# Patient Record
Sex: MALE | Race: AMERICAN INDIAN OR ALASKA NATIVE
[De-identification: names, ages, dates, MRNs, and addresses within clinical notes are randomized per-mention and may not be internally consistent; named-entity substitution may affect disease eponyms.]

---

## 2022-01-30 ENCOUNTER — HOSPITAL ENCOUNTER (EMERGENCY)
Dept: HOSPITAL 5 - ED | Age: 37
Discharge: HOME | End: 2022-01-30
Payer: COMMERCIAL

## 2022-01-30 VITALS — DIASTOLIC BLOOD PRESSURE: 119 MMHG | SYSTOLIC BLOOD PRESSURE: 157 MMHG

## 2022-01-30 DIAGNOSIS — I10: ICD-10-CM
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
## 2022-01-30 DIAGNOSIS — S93.401A: Primary | ICD-10-CM
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
## 2022-01-30 DIAGNOSIS — Z98.890: ICD-10-CM
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
## 2022-01-30 DIAGNOSIS — J45.909: ICD-10-CM

## 2022-01-30 DIAGNOSIS — F17.200: ICD-10-CM
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
## 2022-01-30 DIAGNOSIS — Z79.899: ICD-10-CM

## 2022-01-30 DIAGNOSIS — Z88.0: ICD-10-CM

## 2022-01-30 PROCEDURE — 99283 EMERGENCY DEPT VISIT LOW MDM: CPT

## 2022-01-30 NOTE — XRAY REPORT
RIGHT ANKLE 3 VIEWS

RIGHT FOOT 3 VIEWS



INDICATION:

right medial foot and ankle pain.



COMPARISON:

No relevant prior imaging study available.



FINDINGS:

Right ankle: No fracture or dislocation is seen. No significant degenerative changes. No significant 
soft tissue swelling.



Right foot: No fracture or dislocation. No significant soft tissue swelling.



IMPRESSION:

1. No acute findings.







Signer Name: Gigi Rodrigues MD 

Signed: 1/30/2022 11:52 AM

Workstation Name: [id number]

## 2022-01-30 NOTE — EMERGENCY DEPARTMENT REPORT
ED Lower Extremity HPI





- General


Chief Complaint: Extremity Injury, Lower


Stated Complaint: RGHT ANKLE INJURY


Time Seen by Provider: 01/30/22 11:18


Source: patient


Mode of arrival: Ambulatory


Limitations: No Limitations





- History of Present Illness


Initial Comments: 





Patient is a 36-year-old male presents emergency room complaints of a right 

ankle injury that occurred last night. Patient states that he was getting off 

his stage and did not realize how far down it was and when he stepped down he 

had an inversion injury of his ankle. He states he did not fall completely to 

the ground. He states since then he has been experiencing right ankle pain and 

swelling. He states when he was a kid he did have a fracture of this ankle. He 

denies any numbness or weakness. He is ambulatory but does have discomfort with 

ambulation. Allergy to penicillin. PMHx HTN and asthma





- Related Data


                                  Previous Rx's











 Medication  Instructions  Recorded  Last Taken  Type


 


Benzonatate [Tessalon Perle] 100 mg PO TID PRN #30 capsule 01/01/19 Unknown Rx


 


Naproxen 500 mg PO BID PRN #14 tab 01/30/22 Unknown Rx


 


amLODIPine 5 mg PO DAILY #30 tab 01/30/22 Unknown Rx











                                    Allergies











Allergy/AdvReac Type Severity Reaction Status Date / Time


 


Penicillins Allergy  Rash Verified 01/01/19 13:54














ED Review of Systems


ROS: 


Stated complaint: RGHT ANKLE INJURY


Other details as noted in HPI





Comment: All other systems reviewed and negative





ED Past Medical Hx





- Past Medical History


Previous Medical History?: Yes


Hx Hypertension: Yes (no meds, noncompliance)


Hx Asthma: Yes





- Surgical History


Past Surgical History?: Yes


Additional Surgical History: endocardititis





- Social History


Smoking Status: Current Every Day Smoker


Substance Use Type: Alcohol





- Medications


Home Medications: 


                                Home Medications











 Medication  Instructions  Recorded  Confirmed  Last Taken  Type


 


Benzonatate [Tessalon Perle] 100 mg PO TID PRN #30 capsule 01/01/19  Unknown Rx


 


Naproxen 500 mg PO BID PRN #14 tab 01/30/22  Unknown Rx


 


amLODIPine 5 mg PO DAILY #30 tab 01/30/22  Unknown Rx














ED Physical Exam





- General


Limitations: No Limitations


General appearance: alert, in no apparent distress





- Head


Head exam: Present: atraumatic, normocephalic





- Eye


Eye exam: Present: normal appearance





- ENT


ENT exam: Present: mucous membranes moist





- Extremities Exam


Extremities exam: Present: other (right medial ankle ttp and mild edema, FROM of

the RLE, no obvious deformity, neurovascularly intact)





- Neurological Exam


Neurological exam: Present: alert, oriented X3





- Psychiatric


Psychiatric exam: Present: normal affect, normal mood





- Skin


Skin exam: Present: warm, dry, intact





ED Course


                                   Vital Signs











  01/30/22 01/30/22





  11:16 12:14


 


Temperature 98.6 F 98.4 F


 


Pulse Rate 93 H 88


 


Respiratory 20 14





Rate  


 


Blood Pressure  157/119


 


Blood Pressure 165/106 





[Right]  


 


O2 Sat by Pulse 100 99





Oximetry  














ED Lower Extremity MDM





- Lab Data








                                   Vital Signs











  01/30/22 01/30/22





  11:16 12:14


 


Temperature 98.6 F 98.4 F


 


Pulse Rate 93 H 88


 


Respiratory 20 14





Rate  


 


Blood Pressure  157/119


 


Blood Pressure 165/106 





[Right]  


 


O2 Sat by Pulse 100 99





Oximetry  














- Radiology Data


Radiology results: report reviewed





Ordering Physician: KOMAL CORDOVA  


Date of Service: 01/30/22  


Procedure(s): XR ankle 3+V RT  


Accession Number(s): [id number]  


 


cc: KOMAL CORDOVA   


 


Fluoro Time In Minutes:   


 


RIGHT ANKLE 3 VIEWS  


 RIGHT FOOT 3 VIEWS  


 


 INDICATION:  


 right medial foot and ankle pain.  


 


 COMPARISON:  


 No relevant prior imaging study available.  


 


 FINDINGS:  


 Right ankle: No fracture or dislocation is seen. No significant degenerative 

changes. No 


significant soft tissue swelling.  


 


 Right foot: No fracture or dislocation. No significant soft tissue swelling.  


 


 IMPRESSION:  


 1. No acute findings.  


 


 


 


 Signer Name: Gigi Rodrigues MD   


 Signed: 1/30/2022 11:52 AM  


 Workstation Name: [id number]   


 


 


Transcribed By:   


Dictated By: Gigi Rodrigues MD  


Electronically Authenticated By: Gigi Rodrigues MD    


Signed Date/Time: 01/30/22 1152                                


 


 


 


DD/DT: 01/30/22 1151                                                            

  


TD/TT:





























- Medical Decision Making





Patient is a 36-year-old male presents emergency room complaints of a right 

ankle injury that occurred last night. Patient states that he was getting off 

his stage and did not realize how far down it was and when he stepped down he 

had an inversion injury of his ankle. He states he did not fall completely to 

the ground. He states since then he has been experiencing right ankle pain and 

swelling. He states when he was a kid he did have a fracture of this ankle. He 

denies any numbness or weakness. He is ambulatory but does have discomfort with 

ambulation. Allergy to penicillin. PMHx HTN and asthma. vitals with elevated 

blood pressure, otherwise stable.  Patient states that he has chronic 

hypertension, he is not on medication management, will start patient on low-dose

 amlodipine, advised low-sodium diet, incorporating daily exercise, and keeping 

a blood pressure log and taking this at the primary care physician.  On exam 

right medial ankle ttp and mild edema, FROM of the RLE, no obvious deformity, 

neurovascularly intact.  X-ray right ankle and foot  1. No acute findings.  

Symptoms and examination appear likely consistent with ankle sprain.  Patient 

placed in ankle stirrup splint and given crutches by tech and remained 

neurovascularly intact.  Discussed all findings with patient.  Advised patient 

Please take medication as prescribed.  May use ice 15 minutes at a time, rest, 

elevation of the leg.  Follow-up with a orthopedic doctor.  Return to emergency 

room for any new or worsening symptoms.


Critical care attestation.: 


If time is entered above; I have spent that time in minutes in the direct care 

of this critically ill patient, excluding procedure time.








ED Disposition


Clinical Impression: 


 Uncontrolled hypertension





Right ankle sprain


Qualifiers:


 Encounter type: initial encounter Involved ligament of ankle: unspecified 

ligament Qualified Code(s): S93.401A - Sprain of unspecified ligament of right 

ankle, initial encounter





Disposition: 01 HOME / SELF CARE / HOMELESS


Is pt being admited?: No


Does the pt Need Aspirin: No


Condition: Stable


Instructions:  Ankle Sprain, Elastic Bandage and RICE Therapy, Hypertension (ED)


Additional Instructions: 


Please take medication as prescribed.  May use ice 15 minutes at a time, rest, 

elevation of the leg.  Follow-up with a orthopedic doctor.  Return to emergency 

room for any new or worsening symptoms.


Prescriptions: 


amLODIPine 5 mg PO DAILY #30 tab


Naproxen 500 mg PO BID PRN #14 tab


 PRN Reason: pain


Referrals: 


PRIMARY CARE,MD [Primary Care Provider] - 3-5 Days


DOUG BIGGS MD [Staff Physician] - 3-5 Days


Forms:  Work/School Release Form(ED)


Time of Disposition: 11:57


Print Language: ENGLISH

## 2022-01-30 NOTE — XRAY REPORT
RIGHT ANKLE 3 VIEWS

RIGHT FOOT 3 VIEWS



INDICATION:

right medial foot and ankle pain.



COMPARISON:

No relevant prior imaging study available.



FINDINGS:

Right ankle: No fracture or dislocation is seen. No significant degenerative changes. No significant 
soft tissue swelling.



Right foot: No fracture or dislocation. No significant soft tissue swelling.



IMPRESSION:

1. No acute findings.







Signer Name: Gigi Rodrigues MD 

Signed: 1/30/2022 11:52 AM

Workstation Name: [id number]

## 2022-06-18 ENCOUNTER — HOSPITAL ENCOUNTER (EMERGENCY)
Dept: HOSPITAL 5 - ED | Age: 37
LOS: 2 days | Discharge: LEFT BEFORE DISCHARGE | End: 2022-06-20
Payer: COMMERCIAL

## 2022-06-18 VITALS — SYSTOLIC BLOOD PRESSURE: 147 MMHG | DIASTOLIC BLOOD PRESSURE: 97 MMHG

## 2022-06-18 DIAGNOSIS — R31.9: Primary | ICD-10-CM

## 2022-06-18 DIAGNOSIS — Z53.21: ICD-10-CM

## 2022-06-18 LAB
BILIRUB UR QL STRIP: (no result)
BLOOD UR QL VISUAL: (no result)
HGB S BLD QL SOLY: (no result)
MUCOUS THREADS #/AREA URNS HPF: (no result) /HPF
PH UR STRIP: 5 [PH] (ref 5–7)
RBC #/AREA URNS HPF: 1 /HPF (ref 0–6)
UROBILINOGEN UR-MCNC: 2 MG/DL (ref ?–2)
WBC #/AREA URNS HPF: 2 /HPF (ref 0–6)

## 2022-06-18 PROCEDURE — 81001 URINALYSIS AUTO W/SCOPE: CPT

## 2022-06-21 ENCOUNTER — HOSPITAL ENCOUNTER (EMERGENCY)
Dept: HOSPITAL 5 - ED | Age: 37
Discharge: HOME | End: 2022-06-21
Payer: COMMERCIAL

## 2022-06-21 VITALS — DIASTOLIC BLOOD PRESSURE: 87 MMHG | SYSTOLIC BLOOD PRESSURE: 147 MMHG

## 2022-06-21 DIAGNOSIS — J45.909: ICD-10-CM

## 2022-06-21 DIAGNOSIS — I10: ICD-10-CM

## 2022-06-21 DIAGNOSIS — R31.9: Primary | ICD-10-CM

## 2022-06-21 LAB
MUCOUS THREADS #/AREA URNS HPF: (no result) /HPF
PH UR STRIP: 5 [PH] (ref 5–7)
PROT UR STRIP-MCNC: (no result) MG/DL
RBC #/AREA URNS HPF: < 1 /HPF (ref 0–6)
UROBILINOGEN UR-MCNC: < 2 MG/DL (ref ?–2)
WBC #/AREA URNS HPF: < 1 /HPF (ref 0–6)

## 2022-06-21 PROCEDURE — 81001 URINALYSIS AUTO W/SCOPE: CPT

## 2022-06-21 PROCEDURE — 99283 EMERGENCY DEPT VISIT LOW MDM: CPT

## 2022-06-21 NOTE — EMERGENCY DEPARTMENT REPORT
ED Dysuria HPI





- HPI


Chief Complaint: Urogenital-Male


Stated Complaint: BLOOD IN URINE


Time Seen by Provider: 06/21/22 09:34


Duration: 2 Days


Location of Discomfort: Other


Severity: None


Symptoms: Dysuria: No, Frequency: No, Suprapubic Pain: No, Flank Pain: No, 

Fever: No, Hematuria: Yes, Abdominal Pain: No, Previous UTI's: Yes


Other History: Patient is a 36-year-old male that comes to the emergency room 

reporting blood in his urine.  He denies any pain.  No nausea vomiting.  No 

fever or chills.  No back pain or abdominal pain.  Patient states that the last 

time that he had this he had a UTI.  He denies penile discharge.  Denies 

testicular pain.





ED Review of Systems


ROS: 


Stated complaint: BLOOD IN URINE


Other details as noted in HPI





Comment: All other systems reviewed and negative





ED Past Medical Hx





- Past Medical History


Previous Medical History?: Yes


Hx Hypertension: Yes


Hx Asthma: Yes


Hx Dementia: Yes





- Surgical History


Past Surgical History?: Yes


Additional Surgical History: endocardititis





- Social History


Smoking Status: Never Smoker


Substance Use Type: None





- Medications


Home Medications: 


                                Home Medications











 Medication  Instructions  Recorded  Confirmed  Last Taken  Type


 


Benzonatate [Tessalon Perle] 100 mg PO TID PRN #30 capsule 01/01/19  Unknown Rx


 


Naproxen 500 mg PO BID PRN #14 tab 01/30/22  Unknown Rx


 


amLODIPine 5 mg PO DAILY #30 tab 01/30/22  Unknown Rx














Dysuria Exam





- Exam


General: 


Vital signs noted. No distress. Alert and acting appropriately.





Exam: Yes Moist Mucous Membranes, No CVA Tenderness, No Abdominal Tenderness, No

Rigidity or Guarding


Labs: 


                                   Lab Results











  06/21/22 Range/Units





  Unknown 


 


Urine Color  Yellow  (Yellow)  


 


Urine Turbidity  Clear  (Clear)  


 


Urine pH  5.0  (5.0-7.0)  


 


Ur Specific Gravity  1.030  (1.003-1.030)  


 


Urine Protein  <15 mg/dl  (Negative)  mg/dL


 


Urine Glucose (UA)  Negative  (Negative)  mg/dL


 


Urine Ketones  Negative  (Negative)  mg/dL


 


Urine Blood  Negative  (Negative)  


 


Urine Nitrite  Negative  (Negative)  


 


Ur Reducing Substances  Not Reportable  


 


Urine Bilirubin  Negative  (Negative)  


 


Urine Ictotest  Not Reportable  


 


Urine Urobilinogen  < 2.0  (<2.0)  mg/dL


 


Ur Leukocyte Esterase  Negative  (Negative)  


 


Urine WBC (Auto)  < 1.0  (0.0-6.0)  /HPF


 


Urine RBC (Auto)  < 1.0  (0.0-6.0)  /HPF


 


U Epithel Cells (Auto)  < 1.0  (0-13.0)  /HPF


 


Urine Mucus  Few  /HPF














ED Course


                                   Vital Signs











  06/21/22





  09:16


 


Temperature 98.9 F


 


Pulse Rate 89


 


Respiratory 16





Rate 


 


Blood Pressure 95/80


 


O2 Sat by Pulse 100





Oximetry 














ED Medical Decision Making





- Medical Decision Making





Labs











  06/21/22





  Unknown


 


Urine Color  Yellow


 


Urine Turbidity  Clear


 


Urine pH  5.0


 


Ur Specific Gravity  1.030


 


Urine Protein  <15 mg/dl


 


Urine Glucose (UA)  Negative


 


Urine Ketones  Negative


 


Urine Blood  Negative


 


Urine Nitrite  Negative


 


Ur Reducing Substances  Not Reportable


 


Urine Bilirubin  Negative


 


Urine Ictotest  Not Reportable


 


Urine Urobilinogen  < 2.0


 


Ur Leukocyte Esterase  Negative


 


Urine WBC (Auto)  < 1.0


 


Urine RBC (Auto)  < 1.0


 


U Epithel Cells (Auto)  < 1.0


 


Urine Mucus  Few








                                   Vital Signs











  06/21/22





  09:16


 


Temperature 98.9 F


 


Pulse Rate 89


 


Respiratory 16





Rate 


 


Blood Pressure 95/80


 


O2 Sat by Pulse 100





Oximetry 








UA noted.  There is no leukocytes, nitrates or blood.





I have reassured patient.  He has been follow-up with his PCP.  I encouraged him

to stay well-hydrated.








Given he has no symptoms have not done lab or CT scans.








Patient discharged home with discharge plan of care including diet, activity 

medications and follow-up.  He verbalizes understanding.  Patient is ambulatory 

nontoxic non-ill-appearing and taking p.o.





- Differential Diagnosis


ro uti


Critical care attestation.: 


If time is entered above; I have spent that time in minutes in the direct care 

of this critically ill patient, excluding procedure time.








ED Disposition


Clinical Impression: 


Urine blood


Qualifiers:


 Hematuria type: unspecified type Qualified Code(s): R31.9 - Hematuria, 

unspecified





Disposition: 01 HOME / SELF CARE / HOMELESS


Is pt being admited?: No


Does the pt Need Aspirin: No


Condition: Stable


Instructions:  Dysuria


Additional Instructions: 


Stay well-hydrated with water 





follow-up with PCP if this persist.


No leukocytes or nitrates or blood on urinalysis today








Referrals: 


ZAY GARCIA MD [Staff Physician] - 3-5 Days


Forms:  Work/School Release Form(ED)


Time of Disposition: 10:41